# Patient Record
Sex: FEMALE | Race: AMERICAN INDIAN OR ALASKA NATIVE
[De-identification: names, ages, dates, MRNs, and addresses within clinical notes are randomized per-mention and may not be internally consistent; named-entity substitution may affect disease eponyms.]

---

## 2022-01-18 ENCOUNTER — HOSPITAL ENCOUNTER (OUTPATIENT)
Dept: HOSPITAL 5 - TRG | Age: 27
Discharge: HOME | End: 2022-01-18
Attending: OBSTETRICS & GYNECOLOGY
Payer: MEDICAID

## 2022-01-18 VITALS — DIASTOLIC BLOOD PRESSURE: 65 MMHG | SYSTOLIC BLOOD PRESSURE: 114 MMHG

## 2022-01-18 DIAGNOSIS — Z34.93: Primary | ICD-10-CM

## 2022-01-18 DIAGNOSIS — Z3A.38: ICD-10-CM

## 2022-01-18 PROCEDURE — 36415 COLL VENOUS BLD VENIPUNCTURE: CPT

## 2022-01-18 PROCEDURE — 84112 EVAL AMNIOTIC FLUID PROTEIN: CPT

## 2022-01-18 PROCEDURE — 59025 FETAL NON-STRESS TEST: CPT

## 2022-01-21 ENCOUNTER — HOSPITAL ENCOUNTER (INPATIENT)
Dept: HOSPITAL 5 - APU | Age: 27
LOS: 2 days | Discharge: HOME | End: 2022-01-23
Attending: OBSTETRICS & GYNECOLOGY | Admitting: OBSTETRICS & GYNECOLOGY
Payer: MEDICAID

## 2022-01-21 DIAGNOSIS — E66.01: ICD-10-CM

## 2022-01-21 DIAGNOSIS — K21.9: ICD-10-CM

## 2022-01-21 DIAGNOSIS — Z30.2: ICD-10-CM

## 2022-01-21 DIAGNOSIS — N73.6: ICD-10-CM

## 2022-01-21 DIAGNOSIS — O34.211: ICD-10-CM

## 2022-01-21 DIAGNOSIS — A60.00: ICD-10-CM

## 2022-01-21 DIAGNOSIS — Z3A.39: ICD-10-CM

## 2022-01-21 LAB
BASOPHILS # (AUTO): 0 K/MM3 (ref 0–0.1)
BASOPHILS NFR BLD AUTO: 0.4 % (ref 0–1.8)
EOSINOPHIL # BLD AUTO: 0 K/MM3 (ref 0–0.4)
EOSINOPHIL NFR BLD AUTO: 0.3 % (ref 0–4.3)
HCT VFR BLD CALC: 35.9 % (ref 30.3–42.9)
HGB BLD-MCNC: 11.6 GM/DL (ref 10.1–14.3)
LYMPHOCYTES # BLD AUTO: 1.8 K/MM3 (ref 1.2–5.4)
LYMPHOCYTES NFR BLD AUTO: 39.8 % (ref 13.4–35)
MCHC RBC AUTO-ENTMCNC: 32 % (ref 30–34)
MCV RBC AUTO: 90 FL (ref 79–97)
MONOCYTES # (AUTO): 0.4 K/MM3 (ref 0–0.8)
MONOCYTES % (AUTO): 9.1 % (ref 0–7.3)
PLATELET # BLD: 245 K/MM3 (ref 140–440)
RBC # BLD AUTO: 4 M/MM3 (ref 3.65–5.03)

## 2022-01-21 PROCEDURE — 85025 COMPLETE CBC W/AUTO DIFF WBC: CPT

## 2022-01-21 PROCEDURE — 85018 HEMOGLOBIN: CPT

## 2022-01-21 PROCEDURE — 36415 COLL VENOUS BLD VENIPUNCTURE: CPT

## 2022-01-21 PROCEDURE — 86900 BLOOD TYPING SEROLOGIC ABO: CPT

## 2022-01-21 PROCEDURE — 86592 SYPHILIS TEST NON-TREP QUAL: CPT

## 2022-01-21 PROCEDURE — 0UL70CZ OCCLUSION OF BILATERAL FALLOPIAN TUBES WITH EXTRALUMINAL DEVICE, OPEN APPROACH: ICD-10-PCS | Performed by: OBSTETRICS & GYNECOLOGY

## 2022-01-21 PROCEDURE — 85014 HEMATOCRIT: CPT

## 2022-01-21 PROCEDURE — 86901 BLOOD TYPING SEROLOGIC RH(D): CPT

## 2022-01-21 PROCEDURE — U0003 INFECTIOUS AGENT DETECTION BY NUCLEIC ACID (DNA OR RNA); SEVERE ACUTE RESPIRATORY SYNDROME CORONAVIRUS 2 (SARS-COV-2) (CORONAVIRUS DISEASE [COVID-19]), AMPLIFIED PROBE TECHNIQUE, MAKING USE OF HIGH THROUGHPUT TECHNOLOGIES AS DESCRIBED BY CMS-2020-01-R: HCPCS

## 2022-01-21 PROCEDURE — 86850 RBC ANTIBODY SCREEN: CPT

## 2022-01-21 RX ADMIN — CEFAZOLIN SCH MLS/HR: 330 INJECTION, POWDER, FOR SOLUTION INTRAMUSCULAR; INTRAVENOUS at 20:03

## 2022-01-21 RX ADMIN — KETOROLAC TROMETHAMINE SCH MG: 30 INJECTION, SOLUTION INTRAMUSCULAR at 20:03

## 2022-01-21 RX ADMIN — HYDROMORPHONE HYDROCHLORIDE PRN MG: 1 INJECTION, SOLUTION INTRAMUSCULAR; INTRAVENOUS; SUBCUTANEOUS at 17:31

## 2022-01-21 NOTE — PROGRESS NOTE
Spinal Anesthesia Block





- Spinal Anesthesia Block


Start Time: 12:56


Stop Time: 13:00


Performed by:: RICKIE BERNSTEIN


Procedure: 





Patient IDed, H&P reviewed, all questions and concerns were answered, and 

consent was signed. Timeout was performed at bedside.  Patient in sitting 

position.  Sterile prep and drape was performed.  [3] ml of 1% lidocaine skin 

wheal at L[3]- L [4].  Needle introducer advanced. 25 gauge spinal needle 

advanced.  Clear, free flowing CSF. negative blood, negative paresthesia.  

Spinal dose given. All needles removed.  Patient tolerated procedure.

## 2022-01-21 NOTE — PROCEDURE NOTE
OB Delivery Note





- Delivery


Date of Delivery: 22


Surgeon: MICHELLE PLATT


Estimated blood loss: other (1161 mL)





-  Section


Preop diagnosis: repeat , desires sterilization


Postop diagnosis: same


 section procedure:  section, repeat low transverse, bilateral 

tubal ligation


Disposition: PACU


Complications: uterine atony


Narrative: 





Please see operative report 





- Infant


  ** A


Apgar at 1 minute: 8


Apgar at 5 minutes: 9


Infant Gender: Female (2810g (6lb 3oz) @ 1351 pm)

## 2022-01-21 NOTE — HISTORY AND PHYSICAL REPORT
History of Present Illness


Date of examination: 22


Chief complaint: 





scheduled  section, tubal ligation 


History of present illness: 





Pt is a 26 year old -American female  SHANT 22 at 39w1d who 

presents for scheduled  section and tubal ligation secondary to previous

 x 3 and undesired fertility. She reports irregular contractions and 

denies vaginal bleeding or leakage of fluid. She has had prenatal care at 

[facility] since transfer into care at 15 wks with comanagement by 

APA secondary to morbid obesity, three prior  sections, COVID 19 this 

pregnancy, GERD prescribed omeprazole, genital herpes without lesion or 

prodrome, and undesired fertility. She is GBS positive.  





Past History


Past Medical History: GERD, other (obesity )


Past Surgical History:  section (, ,  )


GYN History: herpes


Family/Genetic History: diabetes, heart disease, cancer


Social history: no significant social history





- Obstetrical History


Expected Date of Delivery: 22


Actual Gestation: 39 Week(s) 1 Day(s) 


: 6


Para: 3


Hx # Term Pregnancies: 3


Number of  Pregnancies: 0


Spontaneous Abortions: 2


Induced : 0


Number of Living Children: 3





Medications and Allergies


                                    Allergies











Allergy/AdvReac Type Severity Reaction Status Date / Time


 


No Known Allergies Allergy   Unverified 05/23/15 05:08











                                Home Medications











 Medication  Instructions  Recorded  Confirmed  Last Taken  Type


 


Ferrous Sulfate [Feosol 325 MG tab] 325 mg PO BID #60 tablet 06/25/15  Unknown 

Rx


 


Ferrous Sulfate [Feosol 325 MG tab] 325 mg PO BID #60 tablet 10/30/19  Unknown 

Rx


 


Ibuprofen [Motrin] 800 mg PO Q8HR PRN #30 tablet 10/30/19  Unknown Rx


 


oxyCODONE /ACETAMINOPHEN [Percocet 1 tab PO Q6HR PRN #40 tablet 10/30/19  

Unknown Rx





5/325]     














Review of Systems


All systems: negative





- Physical Exam


Breasts: Positive: deferred


Abdomen: Positive: soft (obese, gravid )


Uterus: Positive: enlarged (gravid )


Extremities: Positive: edema (trace)





- Obstetrical


FHR: auscultation normal


Uterine Contraction Monitor Mode: External


Uterine Contraction Pattern: Irregular


Uterine Tone Measurement Phase: Resting





Results


All other labs normal.








Assessment and Plan





A: IUP at 39w1d


    Previous  x 3


    Morbid Obesity


    GERD


    Genital Herpes without lesion or prodrome


    Undesired Fertility- consent signed and on chart 


    GBS Positive





P: Routine admission labs


    Proceed with repeat  section, bilateral tubal ligation and other 

indicated procedures

## 2022-01-21 NOTE — OPERATIVE REPORT
Operative Report


Operative Report: 


Date of procedure: 2022 


Preoperative diagnosis: 1) IUP at 39w1d 2) Previous  x 3 3) Undesired 

Fertility 4) Obesity 


Postoperative diagnosis: Same 5) Uterine Atony 


Procedure: 


1) Repeat classical  section 


2) Bilateral tubal ligation via Filshie clip method 


Surgeon: Madison Willoughby M.D.


Anesthesia: Regional 


Findings:


1) Viable female , Apgars 8 and 9, weight 2810g, (6lb 3oz) in cephalic 

presentation


2) Dense adhesions of the bladder to the lower uterine segment


3) Normal appearing uterus ovaries and tubes


Estimated blood loss: 1061 mL by QBL 


Urine output: 700 mL, clear but concentrated prior to the procedure, and at the 

end of the procedure 


Drains: Corona to gravity


Specimens: None 


Complications: Counts correct x 3


Disposition: Stable to PACU





Indication for procedure:


Pt is a 26 year old  at 39w1d with three prior  sections who 

presents for scheduled  section and bilateral tubal ligation.





Operation in Detail: 





After the risks, benefits, alternatives and complications were explained to the 

patient she gave informed consent for the procedure.  She was subsequently taken

to the operating room where regional anesthesia was noted to be adequate.  SCDs 

were noted to be in place and functioning. She was then placed in the dorsal 

supine position with leftward tilt and prepped and draped in a normal sterile 

fashion.  Fetal heart tones were noted prior to incision.  A timeout was 

performed.





A Pfannenstiel skin incision was made with the knife and carried down to the 

layer of the fascia with the Bovie.  The fascia was incised in the midline and 

the fascial incision was extended bilaterally with the Bovie.  The fascial 

incision was then stretched.  The rectus muscles were then  in the 

midline and partially transected for adequate visualization.  The peritoneum was

then entered sharply between two Gina clamps. The peritoneal incision was then 

stretched. 





The vesicouterine peritoneum was noted to be densely adherent to the lower 

uterine segment.  An incision was made with a knife in the active segment of the

uterus in a curvilinear fashion. The hysterotomy was stretched. Amniotomy was 

performed with egress of clear fluid. The fetal head was delivered without 

difficulty followed by delivery of the shoulders and body.   was bulb 

suctioned at delivery.  The cord was clamped and cut and the  was handed 

to NICU staff in attendance. Cord blood was collected. The placenta was then 

delivered manually.  


The uterus was exteriorized and cleared of all clots and debris. The hysterotomy

was then reapproximated in two layers with 0 Monocryl in a running locked 

fashion. Additional figure of eights of 0 Monocryl and 2-0 chromic were used to 

obtain hemostasis.  The hysterotomy was inspected and hemostasis was noted. 

Intermittent atony was noted- Methergine 0.2 mg IM was administered with 

improvement in uterine tone. 





Attention was then turned to the tubal ligation. The left tube was identified, 

grasped with a deja and followed out to the fimbriae. Two Filshie clips were 

placed across the ampullary portion of the fallopian tube. The right tube was 

then identified, followed out to the fimbriae and two Filshie clips were placed 

across the ampullary region of the tube.  Hemostasis was noted. The gutters were

irrigated and cleared of all clots and debris. The hysterotomy was again 

inspected and noted to be hemostatic. Surgicel was placed over the hysterotomy. 





The uterus was returned to the peritoneal cavity. The peritoneum was 

reapproximated with 0 Monocryl in a running fashion incorporating the rectus 

muscles. Additional figure of eights were used to reapproximate the rectus 

muscles.  Surgicel was placed over the rectus muscles. The fascia was 

reapproximated with 0-Vicryl in a running fashion. At this time, the patient 

began to experience more discomfort and asked the case be ended. The skin was 

reapproximated with staples.  The incision was then covered with a pressure 

dressing. The procedure was then ended.  The patient tolerated the procedure 

well and was taken to the PACU in stable condition.  All instrument, lap, and 

needle counts were correct 3.

## 2022-01-21 NOTE — ANESTHESIA CONSULTATION
Anesthesia Consult and Med Hx


Date of service: 01/21/22





- Airway


Anesthetic Teeth Evaluation: Poor


ROM Head & Neck: Adequate


Mental/Hyoid Distance: Adequate


Mallampati Class: Class II


Intubation Access Assessment: Probably Good





- Pulmonary Exam


CTA: Yes





- Cardiac Exam


Anesthetic Concerns: csectionx 3 no anesthesia complications





- Pre-Operative Health Status


ASA Pre-Surgery Classification: ASA2


Proposed Anesthetic Plan: Spinal





- Pulmonary


Hx Smoking: No


Hx Asthma: No


Hx Respiratory Symptoms: No


SOB: No


COPD: No


Home Oxygen Therapy: No


Hx Pneumonia: No


Hx Sleep Apnea: No





- Cardiovascular System


Hx Hypertension: No


Hx Coronary Artery Disease: No


Hx Heart Attack/AMI: No





- Central Nervous System


Hx Neuromuscular Disorder: No


Hx Seizures: No


CVA: No


Hx Back Pain: No


Hx Psychiatric Problems: No





- Gastrointestinal


Hx Ulcer: No


Hx Gastroesophageal Reflux Disease: No





- Endocrine


Hx Renal Disease: No


Hx End Stage Renal Disease: No


Hx Cirrhosis: No


Hx Liver Disease: No


Hx Insulin Dependent Diabetes: No


Hx Non-Insulin Dependent Diabetes: No


Hx Thyroid Disease: No


Hx Hypothyroidism: No


Hx Hyperthyroidism: No





- Hematic


Hx Anemia: No


Hx Sickle Cell Disease: No





- Other Systems


Hx Alcohol Use: No


Hx Substance Use: No


Hx Cancer: No


Hx Obesity: Yes

## 2022-01-22 LAB
HCT VFR BLD CALC: 28.5 % (ref 30.3–42.9)
HGB BLD-MCNC: 9.2 GM/DL (ref 10.1–14.3)

## 2022-01-22 RX ADMIN — OXYCODONE AND ACETAMINOPHEN PRN TAB: 5; 325 TABLET ORAL at 16:15

## 2022-01-22 RX ADMIN — HYDROMORPHONE HYDROCHLORIDE PRN MG: 1 INJECTION, SOLUTION INTRAMUSCULAR; INTRAVENOUS; SUBCUTANEOUS at 00:29

## 2022-01-22 RX ADMIN — OXYCODONE AND ACETAMINOPHEN PRN TAB: 5; 325 TABLET ORAL at 05:56

## 2022-01-22 RX ADMIN — CEFAZOLIN SCH MLS/HR: 330 INJECTION, POWDER, FOR SOLUTION INTRAMUSCULAR; INTRAVENOUS at 02:41

## 2022-01-22 RX ADMIN — OXYCODONE AND ACETAMINOPHEN PRN TAB: 5; 325 TABLET ORAL at 11:47

## 2022-01-22 RX ADMIN — KETOROLAC TROMETHAMINE SCH: 30 INJECTION, SOLUTION INTRAMUSCULAR at 17:45

## 2022-01-22 RX ADMIN — OXYCODONE AND ACETAMINOPHEN PRN TAB: 5; 325 TABLET ORAL at 20:43

## 2022-01-22 RX ADMIN — KETOROLAC TROMETHAMINE SCH MG: 30 INJECTION, SOLUTION INTRAMUSCULAR at 10:32

## 2022-01-22 RX ADMIN — KETOROLAC TROMETHAMINE SCH MG: 30 INJECTION, SOLUTION INTRAMUSCULAR at 02:40

## 2022-01-22 RX ADMIN — FERROUS SULFATE TAB 325 MG (65 MG ELEMENTAL FE) SCH MG: 325 (65 FE) TAB at 10:32

## 2022-01-23 VITALS — DIASTOLIC BLOOD PRESSURE: 65 MMHG | SYSTOLIC BLOOD PRESSURE: 101 MMHG

## 2022-01-23 RX ADMIN — OXYCODONE AND ACETAMINOPHEN PRN TAB: 5; 325 TABLET ORAL at 09:08

## 2022-01-23 RX ADMIN — FERROUS SULFATE TAB 325 MG (65 MG ELEMENTAL FE) SCH MG: 325 (65 FE) TAB at 10:44

## 2022-01-23 RX ADMIN — IBUPROFEN PRN MG: 800 TABLET, FILM COATED ORAL at 04:22

## 2022-01-23 RX ADMIN — IBUPROFEN PRN MG: 800 TABLET, FILM COATED ORAL at 10:44

## 2022-01-23 RX ADMIN — OXYCODONE AND ACETAMINOPHEN PRN TAB: 5; 325 TABLET ORAL at 00:22

## 2022-03-05 ENCOUNTER — HOSPITAL ENCOUNTER (EMERGENCY)
Dept: HOSPITAL 5 - ED | Age: 27
Discharge: HOME | End: 2022-03-05
Payer: MEDICAID

## 2022-03-05 VITALS — DIASTOLIC BLOOD PRESSURE: 74 MMHG | SYSTOLIC BLOOD PRESSURE: 119 MMHG

## 2022-03-05 DIAGNOSIS — R10.12: Primary | ICD-10-CM

## 2022-03-05 DIAGNOSIS — Z87.891: ICD-10-CM

## 2022-03-05 PROCEDURE — 99283 EMERGENCY DEPT VISIT LOW MDM: CPT

## 2022-03-05 PROCEDURE — 74018 RADEX ABDOMEN 1 VIEW: CPT

## 2022-03-05 NOTE — EMERGENCY DEPARTMENT REPORT
ED Abdominal Pain HPI





- General


Chief Complaint: Abdominal Pain


Stated Complaint: LEFT SIDE ABDOMINAL PAIN X3DAYS


Time Seen by Provider: 22 08:37


Source: patient


Mode of arrival: Ambulatory


Limitations: No Limitations





- History of Present Illness


Initial Comments: 





26-year-old female with no past medical history presents to the emergency 

department for evaluation of left upper abdomen pain.  She states that she only 

has pain when she moves around or leans to the other side.  She denies injury 

but states that she had a  on 2022.  She denies bleeding or

drainage from  incision.  She denies fever, nausea, vomiting, diarrhea,

and dysuria.  She states that she is here currently on antibiotics from her 

OB/GYN for treatment of infection to  incision.


MD Complaint: abdominal pain


-: Gradual, days(s) (3)


Location: [facility]


Radiation: none


Migration to: no migration


Severity scale (0 -10): 7 (Only when she moves or leans to the side)


Quality: aching


Consistency: intermittent


Worsens With: movement


Associated Symptoms: denies: nausea, vomiting, diarrhea, fever, chills, dysuria,

hematemesis, hematochezia, melena, hematuria, anorexia





- Related Data


LMP Date: 22


                                  Previous Rx's











 Medication  Instructions  Recorded  Last Taken  Type


 


Ferrous Sulfate [Feosol 325 MG tab] 325 mg PO BID #60 tablet 06/25/15 Unknown Rx


 


Ferrous Sulfate [Feosol 325 MG tab] 325 mg PO BID #60 tablet 10/30/19 Unknown Rx


 


Ibuprofen [Motrin] 800 mg PO Q8HR PRN #30 tablet 10/30/19 Unknown Rx


 


oxyCODONE /ACETAMINOPHEN [Percocet 1 tab PO Q6HR PRN #40 tablet 10/30/19 Unknown

 Rx





5/325]    


 


Ferrous Sulfate [Feosol 325 MG tab] 325 mg PO BID #60 tablet 22 Unknown Rx


 


Ibuprofen [Motrin] 800 mg PO Q8HR PRN #40 tablet 22 Unknown Rx


 


oxyCODONE /ACETAMINOPHEN [Percocet 2 tab PO Q6HR PRN #40 tablet 22 Unknown

 Rx





5/325]    











                                    Allergies











Allergy/AdvReac Type Severity Reaction Status Date / Time


 


No Known Allergies Allergy   Verified 22 10:15














ED Review of Systems


ROS: 


Stated complaint: LEFT SIDE ABDOMINAL PAIN X3DAYS


Other details as noted in HPI





Comment: All other systems reviewed and negative


Constitutional: denies: chills, diaphoresis, fever


Eyes: denies: eye pain


ENT: denies: ear pain


Respiratory: denies: cough, shortness of breath


Cardiovascular: denies: chest pain, palpitations


Endocrine: no symptoms reported


Gastrointestinal: abdominal pain.  denies: nausea, vomiting, diarrhea, 

hematemesis, melena, hematochezia


Genitourinary: denies: urgency, dysuria, frequency, hematuria


Musculoskeletal: denies: back pain, joint swelling


Skin: denies: rash, lesions


Neurological: denies: headache, weakness, numbness


Psychiatric: denies: anxiety


Hematological/Lymphatic: denies: easy bleeding, easy bruising





ED Past Medical Hx





- Past Medical History


Hx Hypertension: No


Hx Heart Attack/AMI: No


Hx Congestive Heart Failure: No


Hx Diabetes: No


Hx Deep Vein Thrombosis: No


Hx Liver Disease: No


Hx Renal Disease: No


Hx Sickle Cell Disease: No


Hx Seizures: No


Hx Asthma: No


Hx COPD: No


Hx HIV: No





- Social History


Smoking Status: Former Smoker





- Medications


Home Medications: 


                                Home Medications











 Medication  Instructions  Recorded  Confirmed  Last Taken  Type


 


Ferrous Sulfate [Feosol 325 MG tab] 325 mg PO BID #60 tablet 06/25/15  Unknown 

Rx


 


Ferrous Sulfate [Feosol 325 MG tab] 325 mg PO BID #60 tablet 10/30/19  Unknown 

Rx


 


Ibuprofen [Motrin] 800 mg PO Q8HR PRN #30 tablet 10/30/19  Unknown Rx


 


oxyCODONE /ACETAMINOPHEN [Percocet 1 tab PO Q6HR PRN #40 tablet 10/30/19  

Unknown Rx





5/325]     


 


Ferrous Sulfate [Feosol 325 MG tab] 325 mg PO BID #60 tablet 22  Unknown 

Rx


 


Ibuprofen [Motrin] 800 mg PO Q8HR PRN #40 tablet 22  Unknown Rx


 


oxyCODONE /ACETAMINOPHEN [Percocet 2 tab PO Q6HR PRN #40 tablet 22  

Unknown Rx





5/325]     














ED Physical Exam





- General


Limitations: No Limitations


General appearance: alert, in no apparent distress





- Head


Head exam: Present: atraumatic, normocephalic





- Eye


Eye exam: Present: normal appearance.  Absent: conjunctival injection





- Neck


Neck exam: Present: normal inspection





- Respiratory


Respiratory exam: Present: normal lung sounds bilaterally.  Absent: respiratory 

distress, wheezes, rales, rhonchi, stridor, chest wall tenderness, accessory 

muscle use





- Cardiovascular


Cardiovascular Exam: Present: regular rate, normal heart sounds.  Absent: 

irregular rhythm





- GI/Abdominal


GI/Abdominal exam: Present: soft, tenderness (Left upper quadrant), normal bowel

sounds.  Absent: distended, guarding, rebound, rigid





- Extremities Exam


Extremities exam: Present: normal inspection





- Back Exam


Back exam: Present: normal inspection.  Absent: tenderness, CVA tenderness (R), 

CVA tenderness (L)





- Neurological Exam


Neurological exam: Present: alert, oriented X3





- Psychiatric


Psychiatric exam: Present: normal affect, normal mood





- Skin


Skin exam: Present: warm, dry, intact, normal color, other ( incision 

site well approximated, no drainage noted, no erythema or edema noted.)





ED Course


                                   Vital Signs











  22





  08:40


 


Temperature 98 F


 


Pulse Rate 80


 


Respiratory 16





Rate 


 


Blood Pressure 119/74





[Left] 


 


O2 Sat by Pulse 100





Oximetry 














ED Medical Decision Making





- Radiology Data


Radiology results: report reviewed, image reviewed





KUB


IMPRESSION:  


 1. No radiographic evidence of acute intra-abdominal process. 





- Medical Decision Making


26-year-old female with no past medical history presents to the emergency 

department for evaluation of left upper abdomen pain.  She states that she only 

has pain when she moves around or leans to the other side.  She denies injury 

but states that she had a  on 2022.  She denies bleeding or

 drainage from  incision.  She denies fever, nausea, vomiting, 

diarrhea, and dysuria.  She states that she is here currently on antibiotics 

from her OB/GYN for treatment of infection to  incision.





Abdominal x-ray without any acute abnormalities.  Patient was advised to use 

Tylenol or Motrin as needed for pain and follow-up with OB/GYN or primary care 

for further evaluation or worsening symptoms.  She verbalized understanding of 

and agreement with plan of care.





Critical care attestation.: 


If time is entered above; I have spent that time in minutes in the direct care 

of this critically ill patient, excluding procedure time.








ED Disposition


Clinical Impression: 


 Abdominal muscle pain





Disposition:  HOME / SELF CARE / HOMELESS


Is pt being admited?: No


Does the pt Need Aspirin: No


Condition: Stable


Instructions:  Musculoskeletal Pain, Abdominal Pain (ED)


Additional Instructions: 


Take Tylenol and ibuprofen as needed for pain.  Drink plenty of noncaffeinated 

fluids.  Follow-up with OB GYN or primary care provider if no improvement or 

worsening symptoms.


Referrals: 


PRIMARY CARE,MD [Primary Care Provider] - 3-5 Days


JENNIFER ZARAGOZA MD [Referring] - 3-5 Days


Time of Disposition: 09:38

## 2022-03-05 NOTE — XRAY REPORT
ABDOMEN 1 VIEW, 3/5/2022



INDICATION / CLINICAL INFORMATION: Left upper quadrant pain



COMPARISON: No relevant prior studies are available for comparison.



FINDINGS:



TUBES / LINES: None.

BOWEL GAS PATTERN: The bowel gas pattern appears nonobstructive. 

ADDITIONAL FINDINGS: No significant additional findings.



IMPRESSION:

1. No radiographic evidence of acute intra-abdominal process.



Signer Name: Elena Hill MD 

Signed: 3/5/2022 9:22 AM

Workstation Name: [email or web address]